# Patient Record
Sex: MALE | Race: WHITE | NOT HISPANIC OR LATINO | Employment: OTHER | URBAN - METROPOLITAN AREA
[De-identification: names, ages, dates, MRNs, and addresses within clinical notes are randomized per-mention and may not be internally consistent; named-entity substitution may affect disease eponyms.]

---

## 2018-01-13 NOTE — PROGRESS NOTES
Assessment    1  Benign essential hypertension (401 1) (I10)   2  Mixed hyperlipidemia (272 2) (E78 2)   3  Parkinson's disease (332 0) (G20)   4  Insomnia (780 52) (G47 00)   5  Vitamin D deficiency (268 9) (E55 9)   6  Basal cell carcinoma of skin (173 91) (C44 91)    Plan  Anemia, Benign essential hypertension, Mixed hyperlipidemia    · (1) CBC/PLT/DIFF; Status:Active; Requested for:17Uht6423;    · (1) COMPREHENSIVE METABOLIC PANEL; Status:Active; Requested for:76Clk0623;    · (1) LIPID PANEL, FASTING; Status:Active; Requested for:42Hpg7407;     Discussion/Summary    --Parkinsons: pt continues to decline  he is still going to daily respite care ([facility]) from m-f, and 24 hour home health aid during the weekends  cont f/u w/ neuro (dr noble)  --Insomnia: stable on trazadone 175 mg at hs    --Lipids: chol 128/40  doing well on crestor 5 mg qd  --Hypothyroid: tsh ok  doing well on synthroid 112 mcg qd  --Vit d: 29  cont otc supplement  --Hx multiple basal cell CA: cont f/u w/ derm as directed    4 mos, FBW prior  Possible side effects of new medications were reviewed with the patient/guardian today  The treatment plan was reviewed with the patient/guardian  The patient/guardian understands and agrees with the treatment plan      Chief Complaint  Pt presents for a med check and to review BW results  Pt did have a few episodes of shallow breathing when I was in the room with him  O2 level was checked three time and a maximum of 98% was reached  Pt stated that he feels like he has a hard time breathing  Patient is here today for follow up of chronic conditions described in HPI  History of Present Illness  recheck chronic medical probs today  Review of Systems    Constitutional: No fever or chills, feels well, no tiredness, no recent weight gain or weight loss     Cardiovascular: No complaints of slow heart rate, no fast heart rate, no chest pain, no palpitations, no leg claudication, no lower extremity  Respiratory: No complaints of shortness of breath, no wheezing, no cough, no SOB on exertion, no orthopnea or PND  Gastrointestinal: No complaints of abdominal pain, no constipation, no nausea or vomiting, no diarrhea or bloody stools  Genitourinary: No complaints of dysuria, no incontinence, no hesitancy, no nocturia, no genital lesion, no testicular pain  Active Problems    1  Abnormal blood chemistry (790 6) (R79 9)   2  Anemia (285 9) (D64 9)   3  Anxiety (300 00) (F41 9)   4  Basal cell carcinoma of skin (173 91) (C44 91)   5  Benign essential hypertension (401 1) (I10)   6  Chronic diarrhea (787 91) (K52 9)   7  Esophagitis (530 10) (K20 9)   8  Hypothyroidism (244 9) (E03 9)   9  Insomnia (780 52) (G47 00)   10  Mixed hyperlipidemia (272 2) (E78 2)   11  Parkinson's disease (332 0) (G20)   12  Positive PPD (795 51) (R76 11)   13  Restless legs syndrome (333 94) (G25 81)   14  Vitamin D deficiency (268 9) (E55 9)    Past Medical History    1  History of Acute sinusitis (461 9) (J01 90)   2  History of Allergic rhinitis (477 9) (J30 9)   3  History of Flu vaccine need (V04 81) (Z23)   4  History of Flu vaccine need (V04 81) (Z23)   5  History of Glaucoma In Both Eyes (365 9)   6  History of Need for prophylactic vaccination and inoculation against influenza (V04 81)   (Z23)   7  History of Pain in ankle joint (719 47) (M25 579)   8  History of Screening examination for pulmonary tuberculosis (V74 1) (Z11 1)   9  History of Screening examination for pulmonary tuberculosis (V74 1) (Z11 1)   10  History of Screening for depression (V79 0) (Z13 89)   11  History of Screening for other and unspecified genitourinary condition (V81 6) (Z13 89)   12  History of Skin lesion (709 9) (L98 9)   13  History of Special screening for other neurological conditions (V80 09) (Z13 89)    The active problems and past medical history were reviewed and updated today        Surgical History    The surgical history was reviewed and updated today  Family History    1  Family history of No Significant Family History    The family history was reviewed and updated today  Social History    · Marital History - Currently    · Never A Smoker  The social history was reviewed and updated today  The social history was reviewed and is unchanged  Current Meds   1  Azopt 1 % Ophthalmic Suspension; INSTILL 1 DROP INTO RIGHT EYE 3 TIMES DAILY; Therapy: 37FMT4819 to (Annetta Brown)  Requested for: 72Vxg1569 Recorded   2  Celecoxib 200 MG Oral Capsule; TAKE 1 CAPSULE DAILY; Therapy: 24OOB5357 to (Evaluate:14Mar2016)  Requested for: 21Wfs8887; Last   Rx:44Kcr2464 Ordered   3  Colestipol HCl - 1 GM Oral Tablet; Take 1 AM, 2 HS; Therapy: 56LTG4080 to (Jennifer Green)  Requested for: 86Hrh3189; Last   Rx:80Uie0811 Ordered   4  Crestor 5 MG Oral Tablet; Take 1 tablet daily; Therapy: 15ULQ3785 to (Evaluate:14Mar2016)  Requested for: 09Fwy3221; Last   Rx:16Nsk4550 Ordered   5  Exelon 6 MG Oral Capsule; TAKE 1 CAPSULE TWICE DAILY; Therapy: 25Fwh4312 to Recorded   6  Latanoprost 0 005 % Ophthalmic Solution; INSTILL 1 DROP Bedtime right eye; Therapy: 77ICF1038 to (Annetta Brown)  Requested for: 12Mgi9295 Recorded   7  Levothyroxine Sodium 112 MCG Oral Tablet; Take 1 tablet daily; Therapy: 10FXY5676 to (Evaluate:14Mar2016)  Requested for: 06Fin6894; Last   Rx:28Wlv6278 Ordered   8  Lisinopril-Hydrochlorothiazide 20-25 MG Oral Tablet; Take 1 tablet daily; Therapy: 71QBE9188 to (Evaluate:14Mar2016)  Requested for: 85Rwy5177; Last   Rx:38Sax1413 Ordered   9  Omeprazole 40 MG Oral Capsule Delayed Release; take 1 capsule daily; Therapy: 94YHA2519 to (Last Rx:08Mar2015)  Requested for: 09DRS9414 Ordered   10  Restasis 0 05 % Ophthalmic Emulsion; INSTILL 1 DROP IN BOTH EYES EVERY 12    HOURS DAILY; Therapy: (Recorded:26Atj6220) to Recorded   11  ROPINIRole HCl - 0 5 MG Oral Tablet;  Take 1 tablet 4 times daily; Therapy: 83GNE4824 to (Evaluate:25Jun2014); Last Rx:00Wdi2177 Ordered   12  ROPINIRole HCl - 2 MG Oral Tablet; TAKE 1 TABLET 3 TIMES DAILY; Therapy: (Recorded:91Mjr5553) to Recorded   13  TraZODone HCl - 150 MG Oral Tablet; TAKE 1 TABLET AT BEDTIME; Therapy: 03QQK3979 to (Evaluate:16Oct2015) Recorded    The medication list was reviewed and updated today  Allergies    1  No Known Drug Allergies    Vitals  Vital Signs [Data Includes: Current Encounter]    Recorded: 94NZT3839 04:15PM   Temperature 98 2 F, Tympanic   Heart Rate 50   Pulse Quality Norm   Systolic 928, LUE, Sitting   Diastolic 82, LUE, Sitting   Weight 185 lb    O2 Saturation 96     Physical Exam    Constitutional   General appearance: No acute distress, well appearing and well nourished  Pulmonary   Respiratory effort: No increased work of breathing or signs of respiratory distress  Auscultation of lungs: Clear to auscultation, equal breath sounds bilaterally, no wheezes, no rales, no rhonci  Cardiovascular   Palpation of heart: Normal PMI, no thrills  Auscultation of heart: Normal rate and rhythm, normal S1 and S2, without murmurs  Examination of extremities for edema and/or varicosities: Normal     Carotid pulses: Normal     Lymphatic   Palpation of lymph nodes in neck: No lymphadenopathy  Psychiatric   Orientation to person, place and time: Normal     Mood and affect: Normal          Signatures   Electronically signed by :  Miguel Merino DO; Feb 1 2016  4:37PM EST                       (Author)

## 2018-01-15 NOTE — RESULT NOTES
Verified Results  * XR CHEST PA & LATERAL 29NMV3177 09:08AM Blossom Bacon Order Number: [id number]     Test Name Result Flag Reference   XR CHEST PA & LATERAL (Report)     CHEST      INDICATION: Cough and wheezing  COMPARISON: 2/22/2014     VIEWS: Frontal and lateral projections; 2 images     FINDINGS:        Cardiomediastinal silhouette appears unremarkable  The lungs are clear  No pneumothorax or pleural effusion  Exaggerated thoracic kyphosis with no discrete compression fracture  Mild diffuse osteopenia  IMPRESSION:     No active pulmonary disease         Workstation performed: [id number]     Signed by:   Jamel Dillon DO   2/25/16

## 2019-04-02 ENCOUNTER — DOCUMENTATION (OUTPATIENT)
Dept: GERIATRICS | Facility: CLINIC | Age: 84
End: 2019-04-02

## 2019-04-02 DIAGNOSIS — G25.81 RESTLESS LEG: ICD-10-CM

## 2019-04-02 DIAGNOSIS — R06.2 WHEEZING: Primary | ICD-10-CM

## 2019-04-24 ENCOUNTER — DOCUMENTATION (OUTPATIENT)
Dept: GERIATRICS | Facility: CLINIC | Age: 84
End: 2019-04-24

## 2019-04-24 DIAGNOSIS — R13.12 OROPHARYNGEAL DYSPHAGIA: ICD-10-CM

## 2019-04-24 DIAGNOSIS — I10 ESSENTIAL HYPERTENSION: ICD-10-CM

## 2019-04-24 DIAGNOSIS — F32.A DEPRESSION, UNSPECIFIED DEPRESSION TYPE: ICD-10-CM

## 2019-04-24 DIAGNOSIS — F03.91 DEMENTIA WITH BEHAVIORAL DISTURBANCE, UNSPECIFIED DEMENTIA TYPE (HCC): Primary | ICD-10-CM

## 2019-04-24 PROBLEM — F03.90 DEMENTIA (HCC): Status: ACTIVE | Noted: 2019-04-24

## 2019-04-25 ENCOUNTER — DOCUMENTATION (OUTPATIENT)
Dept: GERIATRICS | Facility: CLINIC | Age: 84
End: 2019-04-25

## 2019-04-25 DIAGNOSIS — G25.81 RESTLESS LEG: Primary | ICD-10-CM

## 2019-04-25 DIAGNOSIS — R13.12 OROPHARYNGEAL DYSPHAGIA: ICD-10-CM

## 2019-05-20 ENCOUNTER — NURSING HOME VISIT (OUTPATIENT)
Dept: GERIATRICS | Facility: OTHER | Age: 84
End: 2019-05-20
Payer: MEDICARE

## 2019-05-20 DIAGNOSIS — I10 ESSENTIAL HYPERTENSION: ICD-10-CM

## 2019-05-20 DIAGNOSIS — R19.5 LOOSE STOOLS: Primary | ICD-10-CM

## 2019-05-20 DIAGNOSIS — E03.9 ACQUIRED HYPOTHYROIDISM: ICD-10-CM

## 2019-05-20 DIAGNOSIS — F03.91 DEMENTIA WITH BEHAVIORAL DISTURBANCE, UNSPECIFIED DEMENTIA TYPE (HCC): ICD-10-CM

## 2019-05-20 PROCEDURE — 99309 SBSQ NF CARE MODERATE MDM 30: CPT | Performed by: INTERNAL MEDICINE

## 2019-06-21 ENCOUNTER — NURSING HOME VISIT (OUTPATIENT)
Dept: GERIATRICS | Facility: OTHER | Age: 84
End: 2019-06-21
Payer: MEDICARE

## 2019-06-21 DIAGNOSIS — J06.9 UPPER RESPIRATORY TRACT INFECTION, UNSPECIFIED TYPE: Primary | ICD-10-CM

## 2019-06-21 DIAGNOSIS — I10 ESSENTIAL HYPERTENSION: ICD-10-CM

## 2019-06-21 DIAGNOSIS — R13.12 OROPHARYNGEAL DYSPHAGIA: ICD-10-CM

## 2019-06-21 DIAGNOSIS — F03.91 DEMENTIA WITH BEHAVIORAL DISTURBANCE, UNSPECIFIED DEMENTIA TYPE (HCC): ICD-10-CM

## 2019-06-21 PROCEDURE — 99309 SBSQ NF CARE MODERATE MDM 30: CPT | Performed by: INTERNAL MEDICINE

## 2019-07-22 ENCOUNTER — NURSING HOME VISIT (OUTPATIENT)
Dept: GERIATRICS | Facility: OTHER | Age: 84
End: 2019-07-22
Payer: MEDICARE

## 2019-07-22 DIAGNOSIS — F32.A DEPRESSION, UNSPECIFIED DEPRESSION TYPE: Primary | ICD-10-CM

## 2019-07-22 DIAGNOSIS — F03.91 DEMENTIA WITH BEHAVIORAL DISTURBANCE, UNSPECIFIED DEMENTIA TYPE (HCC): ICD-10-CM

## 2019-07-22 DIAGNOSIS — K21.9 GASTROESOPHAGEAL REFLUX DISEASE WITHOUT ESOPHAGITIS: ICD-10-CM

## 2019-07-22 DIAGNOSIS — G25.81 RESTLESS LEG: ICD-10-CM

## 2019-07-22 PROCEDURE — 99309 SBSQ NF CARE MODERATE MDM 30: CPT | Performed by: INTERNAL MEDICINE

## 2019-07-22 NOTE — PROGRESS NOTES
[facility] [address] notes  LONG TERM CARE      NAME: Ramya Rosenberg  AGE: 80 y o  SEX: male    DATE OF ENCOUNTER: 2019    Assessment and Plan   Depression  Continue current meds  Will keep a close eye because patient wife passed away few days ago  Staff have reported he had some lucid moments when he told a member that his wife   Restless leg  Continue current meds  Continues still have symptoms but maybe due to his restlessness and anxiety  Gastroesophageal reflux disease without esophagitis  Continue PPI for now  Some of the respiratory cough symptoms maybe due to reflux      Dementia  Continues to decline  Continue current meds  Continue to require help with all ADLs      Chief Complaint     Unable to get due to dementia    History of Present Illness     79 yo male seen for monthly visit and meds renewal  Patient seen for his Dementia, GERD, restless leg and Depression  Staff continue to report intermittent behaviors  PMHx     Past Medical History:   Diagnosis Date    Ambulatory dysfunction     Anemia     Anxiety     Arthritis     BPH (benign prostatic hyperplasia)     Dementia     Depression     GERD (gastroesophageal reflux disease)     Hyperlipidemia     Hypertension     Lewy body dementia with behavioral disturbance     Restless leg      No past surgical history on file  No family history on file    Social History     Socioeconomic History    Marital status: /Civil Union     Spouse name: Not on file    Number of children: Not on file    Years of education: Not on file    Highest education level: Not on file   Occupational History    Not on file   Social Needs    Financial resource strain: Not on file    Food insecurity:     Worry: Not on file     Inability: Not on file    Transportation needs:     Medical: Not on file     Non-medical: Not on file   Tobacco Use    Smoking status: Not on file   Substance and Sexual Activity    Alcohol use: Not Currently    Drug use: Never    Sexual activity: Not on file   Lifestyle    Physical activity:     Days per week: Not on file     Minutes per session: Not on file    Stress: Not on file   Relationships    Social connections:     Talks on phone: Not on file     Gets together: Not on file     Attends Cheondoism service: Not on file     Active member of club or organization: Not on file     Attends meetings of clubs or organizations: Not on file     Relationship status: Not on file    Intimate partner violence:     Fear of current or ex partner: Not on file     Emotionally abused: Not on file     Physically abused: Not on file     Forced sexual activity: Not on file   Other Topics Concern    Not on file   Social History Narrative    Not on file     Allergies no known allergies    Review of Systems     Unable to get due to his dementia    Objective   Vital signs:  BP: 110/60  HR: 60  RR: 24  TEMP: 97 3F  SAT : 95%    PHYSICAL EXAM:  GENERAL: restless, chronically ill appearing   SKIN: warm, dry, no rash, no cyanosis  HEENT: normocephalic, atraumatic, no JVD, no Thyromegaly, no lymphadenopathy  LUNGS: CTA, no wheezing, no rales, expanded equally, no chest tenderness   HEART: normal rhythm, normal rate, no murmur, no gallop  ABDOMEN: soft non tender non distended bs+, no guarding or rebound tenderness  :  no suprapubic tenderness  MUSCULOSKELETAL: moves all extremities, no calf tenderness  NEUROLOGY: awake, alert, CN2-12 intact  PSYCH: cooperative, pleasant  Pertinent Laboratory/Diagnostic Studies:  Recent labs and diagnostic tests reviewed in nursing home EMR    Current Medications   Medications reviewed and signed off on nursing home EMR          Delisa Middleton MD

## 2019-07-22 NOTE — ASSESSMENT & PLAN NOTE
Continue current meds  Will keep a close eye because patient wife passed away few days ago  Staff have reported he had some lucid moments when he told a member that his wife

## 2019-08-20 ENCOUNTER — NURSING HOME VISIT (OUTPATIENT)
Dept: GERIATRICS | Facility: OTHER | Age: 84
End: 2019-08-20
Payer: MEDICARE

## 2019-08-20 DIAGNOSIS — F03.91 DEMENTIA WITH BEHAVIORAL DISTURBANCE, UNSPECIFIED DEMENTIA TYPE (HCC): Primary | ICD-10-CM

## 2019-08-20 DIAGNOSIS — I10 ESSENTIAL HYPERTENSION: ICD-10-CM

## 2019-08-20 PROCEDURE — 99308 SBSQ NF CARE LOW MDM 20: CPT | Performed by: INTERNAL MEDICINE

## 2019-08-20 NOTE — PROGRESS NOTES
[facility] [address] notes  LONG TERM CARE      NAME: Geeta Medina  AGE: 80 y o  SEX: male    DATE OF ENCOUNTER: 8/20/2019    Assessment and Plan   Dementia  Continues to decline and seems to be more since his wife passed away  Continue current meds  Continues to have frequent nights patient is awake or crawling out of bed  Hypertension  BP reviewed in good range  Continue current meds  Continue monitoring      Chief Complaint     Unable to get due to dementia    History of Present Illness     79 yo male seen for monthly visit and med renewal  Patient continues to have frequent nights he is up and climbing out of bed at times  Patient seen for dementia and hypertension  PMHx     Past Medical History:   Diagnosis Date    Ambulatory dysfunction     Anemia     Anxiety     Arthritis     BPH (benign prostatic hyperplasia)     Dementia     Depression     GERD (gastroesophageal reflux disease)     Hyperlipidemia     Hypertension     Lewy body dementia with behavioral disturbance     Restless leg      No past surgical history on file  No family history on file    Social History     Socioeconomic History    Marital status: /Civil Union     Spouse name: Not on file    Number of children: Not on file    Years of education: Not on file    Highest education level: Not on file   Occupational History    Not on file   Social Needs    Financial resource strain: Not on file    Food insecurity:     Worry: Not on file     Inability: Not on file    Transportation needs:     Medical: Not on file     Non-medical: Not on file   Tobacco Use    Smoking status: Not on file   Substance and Sexual Activity    Alcohol use: Not Currently    Drug use: Never    Sexual activity: Not on file   Lifestyle    Physical activity:     Days per week: Not on file     Minutes per session: Not on file    Stress: Not on file   Relationships    Social connections:     Talks on phone: Not on file     Gets together: Not on file     Attends Rastafari service: Not on file     Active member of club or organization: Not on file     Attends meetings of clubs or organizations: Not on file     Relationship status: Not on file    Intimate partner violence:     Fear of current or ex partner: Not on file     Emotionally abused: Not on file     Physically abused: Not on file     Forced sexual activity: Not on file   Other Topics Concern    Not on file   Social History Narrative    Not on file     Allergies no known allergies    Review of Systems     Unable to get due to dementia  Objective   Vital signs:  BP: 112/56  HR: 60  RR: 16  TEMP: 97 5F  SAT : 96%    PHYSICAL EXAM:  GENERAL: no acute distress  SKIN: warm, dry, no rash, no cyanosis  HEENT: normocephalic, atraumatic, no JVD, no Thyromegaly, no lymphadenopathy  LUNGS: CTA but decreased at bases, no wheezing, no rales, expanded equally, no chest tenderness   HEART: normal rhythm, normal rate, no murmur, no gallop  ABDOMEN: soft non tender non distended bs+, no guarding or rebound tenderness  :  no suprapubic tenderness  MUSCULOSKELETAL: moves extremities at random, no calf tenderness  NEUROLOGY: awake, alert,   PSYCH: cooperative, pleasant  Pertinent Laboratory/Diagnostic Studies:  Recent labs and diagnostic tests reviewed in nursing home EMR    Current Medications   Medications reviewed and signed off on nursing home EMR          Sharon Quiroga MD

## 2019-08-20 NOTE — ASSESSMENT & PLAN NOTE
Continues to decline and seems to be more since his wife passed away  Continue current meds  Continues to have frequent nights patient is awake or crawling out of bed

## 2019-09-24 ENCOUNTER — NURSING HOME VISIT (OUTPATIENT)
Dept: GERIATRICS | Facility: OTHER | Age: 84
End: 2019-09-24
Payer: MEDICARE

## 2019-09-24 DIAGNOSIS — R29.6 MULTIPLE FALLS: Primary | ICD-10-CM

## 2019-09-24 DIAGNOSIS — F03.91 DEMENTIA WITH BEHAVIORAL DISTURBANCE, UNSPECIFIED DEMENTIA TYPE (HCC): ICD-10-CM

## 2019-09-24 DIAGNOSIS — F32.A DEPRESSION, UNSPECIFIED DEPRESSION TYPE: ICD-10-CM

## 2019-09-24 PROCEDURE — 99308 SBSQ NF CARE LOW MDM 20: CPT | Performed by: INTERNAL MEDICINE

## 2019-09-24 NOTE — ASSESSMENT & PLAN NOTE
Seems to be more depressed which maybe causing some of the behavior  Wife had passed away few months ago will need time to get over it  Will continue current meds  No GDR at present time

## 2019-09-24 NOTE — PROGRESS NOTES
[facility] [address] notes  LONG TERM CARE      NAME: Natasha Heading  AGE: 80 y o  SEX: male    DATE OF ENCOUNTER: 9/24/2019    Assessment and Plan   Multiple falls  Self transfer and poor safety awareness  No injuries  No rechecks no change from baseline      Dementia  Continues to decline with increased behaviors  Will not change any meds at present time  Continue current meds  Reviewed medoption recommendations    Depression  Seems to be more depressed which maybe causing some of the behavior  Wife had passed away few months ago will need time to get over it  Will continue current meds  No GDR at present time  Chief Complaint     Unable to get due to dementia    History of Present Illness     79 yo male seen for monthly visit and med renewal  Patient has had 2 falls recently but no injuries  Patient is seen for his falls, Depression and dementia  Nursing notes reviewed since last visit  PMHx     Past Medical History:   Diagnosis Date    Ambulatory dysfunction     Anemia     Anxiety     Arthritis     BPH (benign prostatic hyperplasia)     Dementia     Depression     GERD (gastroesophageal reflux disease)     Hyperlipidemia     Hypertension     Lewy body dementia with behavioral disturbance     Restless leg      No past surgical history on file  No family history on file    Social History     Socioeconomic History    Marital status: /Civil Union     Spouse name: Not on file    Number of children: Not on file    Years of education: Not on file    Highest education level: Not on file   Occupational History    Not on file   Social Needs    Financial resource strain: Not on file    Food insecurity:     Worry: Not on file     Inability: Not on file    Transportation needs:     Medical: Not on file     Non-medical: Not on file   Tobacco Use    Smoking status: Not on file   Substance and Sexual Activity    Alcohol use: Not Currently    Drug use: Never    Sexual activity: Not on file   Lifestyle    Physical activity:     Days per week: Not on file     Minutes per session: Not on file    Stress: Not on file   Relationships    Social connections:     Talks on phone: Not on file     Gets together: Not on file     Attends Denominational service: Not on file     Active member of club or organization: Not on file     Attends meetings of clubs or organizations: Not on file     Relationship status: Not on file    Intimate partner violence:     Fear of current or ex partner: Not on file     Emotionally abused: Not on file     Physically abused: Not on file     Forced sexual activity: Not on file   Other Topics Concern    Not on file   Social History Narrative    Not on file     Allergies no known allergies     Review of Systems     Unable to get proper details due to dementia      Objective   Vital signs:  BP: 132/68  HR: 70  RR: 20  TEMP: 98 1F  SAT : 98%    PHYSICAL EXAM:  GENERAL: no acute distress, chronically ill appearing  SKIN: warm, dry, no rash, no cyanosis, surgical scars on the face  HEENT: normocephalic, atraumatic, no JVD, no Thyromegaly, no lymphadenopathy  LUNGS: CTA, no wheezing, no rales, expanded equally, no chest tenderness   HEART: normal rhythm, normal rate, no murmur, no gallop  ABDOMEN: soft non tender non distended bs+, no guarding or rebound tenderness  :  no suprapubic tenderness  MUSCULOSKELETAL: moves all extremites, no calf tenderness  NEUROLOGY: awake, alert, CN2-12 intact  PSYCH: cooperative, pleasant  Pertinent Laboratory/Diagnostic Studies:  Recent labs and diagnostic tests reviewed in nursing home EMR    Current Medications   Medications reviewed and signed off on nursing home EMR          Aspen Baires MD

## 2019-09-24 NOTE — ASSESSMENT & PLAN NOTE
Continues to decline with increased behaviors  Will not change any meds at present time  Continue current meds  Reviewed medoption recommendations

## 2019-10-23 ENCOUNTER — NURSING HOME VISIT (OUTPATIENT)
Dept: GERIATRICS | Facility: OTHER | Age: 84
End: 2019-10-23
Payer: MEDICARE

## 2019-10-23 DIAGNOSIS — I10 ESSENTIAL HYPERTENSION: ICD-10-CM

## 2019-10-23 DIAGNOSIS — F03.91 DEMENTIA WITH BEHAVIORAL DISTURBANCE, UNSPECIFIED DEMENTIA TYPE (HCC): Primary | ICD-10-CM

## 2019-10-23 PROCEDURE — 99308 SBSQ NF CARE LOW MDM 20: CPT | Performed by: INTERNAL MEDICINE

## 2019-10-23 NOTE — ASSESSMENT & PLAN NOTE
Continues to self transfer  And has had multiple falls  Continue current meds   Continue with help with ADLs

## 2019-10-23 NOTE — PROGRESS NOTES
[facility] [address] notes  LONG TERM CARE      NAME: Brent Souza  AGE: 80 y o  SEX: male    DATE OF ENCOUNTER: 10/23/2019    Assessment and Plan   Dementia  Continues to self transfer  And has had multiple falls  Continue current meds   Continue with help with ADLs    Hypertension  BP reviewed for the last 2 months, good range  Continue current meds      Chief Complaint     Unable to get due to dementia but does seems state something about his eye    History of Present Illness     81 yo male seen for monthly visit and med renewal  Patient seen for his dementia, and HTN  Reviewed nursing notes  The right eye is slightly red which maybe causing some irritations  PMHx     Past Medical History:   Diagnosis Date    Ambulatory dysfunction     Anemia     Anxiety     Arthritis     BPH (benign prostatic hyperplasia)     Dementia     Depression     GERD (gastroesophageal reflux disease)     Hyperlipidemia     Hypertension     Lewy body dementia with behavioral disturbance     Restless leg      No past surgical history on file  No family history on file    Social History     Socioeconomic History    Marital status: /Civil Union     Spouse name: Not on file    Number of children: Not on file    Years of education: Not on file    Highest education level: Not on file   Occupational History    Not on file   Social Needs    Financial resource strain: Not on file    Food insecurity:     Worry: Not on file     Inability: Not on file    Transportation needs:     Medical: Not on file     Non-medical: Not on file   Tobacco Use    Smoking status: Not on file   Substance and Sexual Activity    Alcohol use: Not Currently    Drug use: Never    Sexual activity: Not on file   Lifestyle    Physical activity:     Days per week: Not on file     Minutes per session: Not on file    Stress: Not on file   Relationships    Social connections:     Talks on phone: Not on file     Gets together: Not on file     Attends Taoist service: Not on file     Active member of club or organization: Not on file     Attends meetings of clubs or organizations: Not on file     Relationship status: Not on file    Intimate partner violence:     Fear of current or ex partner: Not on file     Emotionally abused: Not on file     Physically abused: Not on file     Forced sexual activity: Not on file   Other Topics Concern    Not on file   Social History Narrative    Not on file     Allergies no known allergies    Review of Systems     Unable to get due to dementia        Objective   Vital signs:  BP: 122/62  HR: 70  RR: 16  TEMP: 98 6F      PHYSICAL EXAM:  GENERAL: no acute distress, chronically ill appearing  SKIN: warm, dry, no rash, no cyanosis  HEENT: normocephalic, atraumatic, no JVD, no Thyromegaly, no lymphadenopathy, right eye slight erythema  LUNGS: CTA, no wheezing, no rales, expanded equally, no chest tenderness   HEART: normal rhythm, normal rate, no murmur, no gallop  ABDOMEN: soft non tender non distended bs+, no guarding or rebound tenderness  :  no suprapubic tenderness  MUSCULOSKELETAL: moves all extremities but difficult to assess strength due to cognition, no calf tenderness  NEUROLOGY: awake, alert, CN2-12 intact  PSYCH: cooperative, pleasant  Pertinent Laboratory/Diagnostic Studies:  Recent labs and diagnostic tests reviewed in nursing home EMR    Current Medications   Medications reviewed and signed off on nursing home EMR          Manju Schmidt MD

## 2019-11-20 ENCOUNTER — NURSING HOME VISIT (OUTPATIENT)
Dept: GERIATRICS | Facility: OTHER | Age: 84
End: 2019-11-20
Payer: MEDICARE

## 2019-11-20 DIAGNOSIS — K21.9 GASTROESOPHAGEAL REFLUX DISEASE WITHOUT ESOPHAGITIS: Primary | ICD-10-CM

## 2019-11-20 DIAGNOSIS — F32.A DEPRESSION, UNSPECIFIED DEPRESSION TYPE: ICD-10-CM

## 2019-11-20 PROCEDURE — 99308 SBSQ NF CARE LOW MDM 20: CPT | Performed by: INTERNAL MEDICINE

## 2019-11-20 NOTE — PROGRESS NOTES
[facility] [address] notes  LONG TERM CARE      NAME: Xena Alvarez  AGE: 80 y o  SEX: male    DATE OF ENCOUNTER: 11/20/2019    Assessment and Plan   Gastroesophageal reflux disease without esophagitis  Will try tapering med off and seeing how patient does  Continue monitor for symptoms  Depression  Continue current meds  Continue monitoring symptoms  Chief Complaint     Unable to get due to his dementia    History of Present Illness     79 yo male seen for monthly visit and med renewal  Patient seen for his GERD and depression  Reviewed nursing notes since last visit  PMHx     Past Medical History:   Diagnosis Date    Ambulatory dysfunction     Anemia     Anxiety     Arthritis     BPH (benign prostatic hyperplasia)     Dementia     Depression     GERD (gastroesophageal reflux disease)     Hyperlipidemia     Hypertension     Lewy body dementia with behavioral disturbance     Restless leg      No past surgical history on file  No family history on file    Social History     Socioeconomic History    Marital status: /Civil Union     Spouse name: Not on file    Number of children: Not on file    Years of education: Not on file    Highest education level: Not on file   Occupational History    Not on file   Social Needs    Financial resource strain: Not on file    Food insecurity:     Worry: Not on file     Inability: Not on file    Transportation needs:     Medical: Not on file     Non-medical: Not on file   Tobacco Use    Smoking status: Not on file   Substance and Sexual Activity    Alcohol use: Not Currently    Drug use: Never    Sexual activity: Not on file   Lifestyle    Physical activity:     Days per week: Not on file     Minutes per session: Not on file    Stress: Not on file   Relationships    Social connections:     Talks on phone: Not on file     Gets together: Not on file     Attends Gnosticist service: Not on file     Active member of club or organization: Not on file     Attends meetings of clubs or organizations: Not on file     Relationship status: Not on file    Intimate partner violence:     Fear of current or ex partner: Not on file     Emotionally abused: Not on file     Physically abused: Not on file     Forced sexual activity: Not on file   Other Topics Concern    Not on file   Social History Narrative    Not on file     Allergies no known allergies    Review of Systems     Unable to get due to dementia      Objective   Vital signs:  No recent vitals due to being on comfort care    PHYSICAL EXAM:  GENERAL: no acute distress, chronically ill appearing  SKIN: warm, dry, no rash, no cyanosis  HEENT: normocephalic, atraumatic, no JVD, no Thyromegaly, no lymphadenopathy  LUNGS: CTA, no wheezing, no rales, expanded equally, no chest tenderness   HEART: normal rhythm, normal rate, no murmur, no gallop  ABDOMEN: soft non tender non distended bs+, no guarding or rebound tenderness  :  no suprapubic tenderness  MUSCULOSKELETAL: moves all extremities,  no calf tenderness  NEUROLOGY: awake, alert, CN2-12 intact  PSYCH: cooperative, pleasant  Pertinent Laboratory/Diagnostic Studies:  Recent labs and diagnostic tests reviewed in nursing home EMR    Current Medications   Medications reviewed and signed off on nursing home EMR          Cristiano Motley MD

## 2019-12-13 ENCOUNTER — NURSING HOME VISIT (OUTPATIENT)
Dept: GERIATRICS | Facility: OTHER | Age: 84
End: 2019-12-13
Payer: MEDICARE

## 2019-12-13 DIAGNOSIS — F02.81 LEWY BODY DEMENTIA WITH BEHAVIORAL DISTURBANCE (HCC): Primary | ICD-10-CM

## 2019-12-13 DIAGNOSIS — G31.83 LEWY BODY DEMENTIA WITH BEHAVIORAL DISTURBANCE (HCC): Primary | ICD-10-CM

## 2019-12-13 PROCEDURE — 99309 SBSQ NF CARE MODERATE MDM 30: CPT | Performed by: INTERNAL MEDICINE

## 2019-12-13 NOTE — ASSESSMENT & PLAN NOTE
Continues to decline, now pocketing food   Discontinued meds such as tylenol, MVI and lisinopril   Started on roxanol for pain   Also ordered to taper off the meds     Family understands his condition and he has decline significantly

## 2019-12-13 NOTE — PROGRESS NOTES
[facility] [address] notes  LONG TERM CARE      NAME: Deven Pleitez  AGE: 80 y o  SEX: male    DATE OF ENCOUNTER: 12/13/2019    Assessment and Plan   Dementia  Continues to decline, now pocketing food   Discontinued meds such as tylenol, MVI and lisinopril   Started on roxanol for pain   Also ordered to taper off the meds  Family understands his condition and he has decline significantly       Chief Complaint     Weak and points to the knees    History of Present Illness     79 yo male seen as per staff request  As per staff the patient had been declining overall  Staff reports his swallow had gotten worst  The symptoms worst since about 2 days  He has been having poor gag reflex and had to have suction done because of breathing problem  When he was suction pills were obtained  Reviewed nursing notes since last visit  PMHx     Past Medical History:   Diagnosis Date    Ambulatory dysfunction     Anemia     Anxiety     Arthritis     BPH (benign prostatic hyperplasia)     Dementia     Depression     GERD (gastroesophageal reflux disease)     Hyperlipidemia     Hypertension     Lewy body dementia with behavioral disturbance     Restless leg      No past surgical history on file  No family history on file    Social History     Socioeconomic History    Marital status: /Civil Union     Spouse name: Not on file    Number of children: Not on file    Years of education: Not on file    Highest education level: Not on file   Occupational History    Not on file   Social Needs    Financial resource strain: Not on file    Food insecurity:     Worry: Not on file     Inability: Not on file    Transportation needs:     Medical: Not on file     Non-medical: Not on file   Tobacco Use    Smoking status: Not on file   Substance and Sexual Activity    Alcohol use: Not Currently    Drug use: Never    Sexual activity: Not on file   Lifestyle    Physical activity:     Days per week: Not on file     Minutes per session: Not on file    Stress: Not on file   Relationships    Social connections:     Talks on phone: Not on file     Gets together: Not on file     Attends Amish service: Not on file     Active member of club or organization: Not on file     Attends meetings of clubs or organizations: Not on file     Relationship status: Not on file    Intimate partner violence:     Fear of current or ex partner: Not on file     Emotionally abused: Not on file     Physically abused: Not on file     Forced sexual activity: Not on file   Other Topics Concern    Not on file   Social History Narrative    Not on file     Allergies no known allergies    Review of Systems     Unable to get due to dementia      Objective   Vital signs:  No recent vitals due to being on comfort care    PHYSICAL EXAM:  GENERAL: no acute distress but chronically ill appearing  SKIN: warm, dry, no rash, no cyanosis  HEENT: normocephalic, atraumatic, no JVD, no Thyromegaly, no lymphadenopathy  LUNGS: decreased at the bases, no wheezing, no rales, expanded equally, no chest tenderness   HEART: normal rhythm, normal rate, no murmur, no gallop  ABDOMEN: soft non tender non distended bs+, no guarding or rebound tenderness  :  no suprapubic tenderness  MUSCULOSKELETAL: moves extremities, no calf tenderness  NEUROLOGY: awake, alert,  CN2-12 intact  PSYCH: cooperative, pleasant  Impaired memory      Pertinent Laboratory/Diagnostic Studies:  Recent labs and diagnostic tests reviewed in nursing home EMR    Current Medications   Medications reviewed and signed off on nursing home EMR          Patricia Donnelly MD

## 2019-12-25 ENCOUNTER — TELEPHONE (OUTPATIENT)
Dept: OTHER | Facility: OTHER | Age: 84
End: 2019-12-25

## 2019-12-26 NOTE — TELEPHONE ENCOUNTER
please call  [facility]  for orders on pt  Deven Low 07//07/26  [phone]  TT DR Amy Leon